# Patient Record
Sex: FEMALE | Race: WHITE | NOT HISPANIC OR LATINO | Employment: FULL TIME | URBAN - METROPOLITAN AREA
[De-identification: names, ages, dates, MRNs, and addresses within clinical notes are randomized per-mention and may not be internally consistent; named-entity substitution may affect disease eponyms.]

---

## 2022-04-11 ENCOUNTER — HOSPITAL ENCOUNTER (EMERGENCY)
Facility: HOSPITAL | Age: 32
Discharge: HOME | End: 2022-04-11
Attending: EMERGENCY MEDICINE

## 2022-04-11 VITALS
OXYGEN SATURATION: 100 % | DIASTOLIC BLOOD PRESSURE: 76 MMHG | TEMPERATURE: 98 F | SYSTOLIC BLOOD PRESSURE: 121 MMHG | HEART RATE: 62 BPM | HEIGHT: 67 IN | RESPIRATION RATE: 16 BRPM | WEIGHT: 135 LBS | BODY MASS INDEX: 21.19 KG/M2

## 2022-04-11 DIAGNOSIS — T16.1XXA FOREIGN BODY OF RIGHT EAR, INITIAL ENCOUNTER: Primary | ICD-10-CM

## 2022-04-11 PROCEDURE — 25000003 PHARM REV CODE 250: Mod: ER | Performed by: PHYSICIAN ASSISTANT

## 2022-04-11 PROCEDURE — 99283 EMERGENCY DEPT VISIT LOW MDM: CPT | Mod: 25,ER

## 2022-04-11 PROCEDURE — 69200 CLEAR OUTER EAR CANAL: CPT | Mod: RT,ER

## 2022-04-11 RX ORDER — DOCUSATE SODIUM 50 MG/5ML
100 LIQUID ORAL
Status: COMPLETED | OUTPATIENT
Start: 2022-04-11 | End: 2022-04-11

## 2022-04-11 RX ADMIN — DOCUSATE SODIUM 100 MG: 50 LIQUID ORAL at 07:04

## 2022-04-12 NOTE — ED PROVIDER NOTES
Encounter Date: 4/11/2022       History     Chief Complaint   Patient presents with    Foreign Body in Ear     Pt had bug crawl in ear last night, put olive oil in ear no longer feels it moving but did not come out of right ear.      HPI: Teresa Bennett, a 31 y.o. female  has no past medical history on file.     She presents to the ED for evaluation of FB to right ear.  States that she felt a bug crawling to area.  Tried olive oil and irrigation but it concerned that it is still present.  Denies pain.        The history is provided by the patient.     Review of patient's allergies indicates:  No Known Allergies  No past medical history on file.  No past surgical history on file.  No family history on file.     Review of Systems   Constitutional: Negative for fever.   HENT:        FB to right ear   Skin: Negative for color change and rash.   Allergic/Immunologic: Negative for immunocompromised state.   Hematological: Negative for adenopathy.   Psychiatric/Behavioral: Negative for agitation.   All other systems reviewed and are negative.      Physical Exam     Initial Vitals [04/11/22 1933]   BP Pulse Resp Temp SpO2   120/77 60 16 98.4 °F (36.9 °C) 99 %      MAP       --         Physical Exam    Nursing note and vitals reviewed.  Constitutional: She appears well-developed and well-nourished. She is not diaphoretic. No distress.   HENT:   Head: Normocephalic and atraumatic.   Right Ear: External ear normal.   Left Ear: External ear normal.   Nose: Nose normal.   Eyes: Conjunctivae and EOM are normal.   Neck:   Normal range of motion.  Cardiovascular: Normal rate and regular rhythm.   Pulmonary/Chest: No respiratory distress.   Musculoskeletal:         General: Normal range of motion.      Cervical back: Normal range of motion.     Neurological: She is alert and oriented to person, place, and time.   Skin: No rash noted.   Psychiatric: She has a normal mood and affect. Thought content normal.         ED Course   Foreign  Body    Date/Time: 4/11/2022 8:15 PM  Performed by: Marika Jameson PA-C  Authorized by: Aguilar Miller MD   Consent Done: Yes  Consent: Verbal consent obtained.  Risks and benefits: risks, benefits and alternatives were discussed  Consent given by: patient  Body area: ear  Removal mechanism: irrigation  Complexity: simple  1 objects recovered.  Objects recovered: insect  Post-procedure assessment: foreign body removed  Patient tolerance: Patient tolerated the procedure well with no immediate complications      Labs Reviewed - No data to display       Imaging Results    None          Medications   docusate 50 mg/5 mL liquid 100 mg (100 mg Oral Given 4/11/22 1945)     Medical Decision Making:   Initial Assessment:   FB to ear   Differential Diagnosis:   FB to ear, TM perforation, AOM   ED Management:  Patient presents to the ER for evaluation potential intact to right ear.  Area was irrigated with return of small brooke .  No further consequence to the patient.  Patient needs no further follow-up at this time.                      Clinical Impression:   Final diagnoses:  [T16.1XXA] Foreign body of right ear, initial encounter (Primary)          ED Disposition Condition    Discharge Stable        ED Prescriptions     None        Follow-up Information    None          Marika Jameson PA-C  04/11/22 2017

## 2022-04-12 NOTE — ED NOTES
Pt right ear flushed with warm water after colace sat 15 min, small bug with wings flushed out of ear. MD at bedside.

## 2022-12-20 ENCOUNTER — HOSPITAL ENCOUNTER (EMERGENCY)
Facility: HOSPITAL | Age: 32
Discharge: HOME | End: 2022-12-20
Attending: STUDENT IN AN ORGANIZED HEALTH CARE EDUCATION/TRAINING PROGRAM

## 2022-12-20 VITALS
RESPIRATION RATE: 17 BRPM | HEART RATE: 90 BPM | TEMPERATURE: 98 F | DIASTOLIC BLOOD PRESSURE: 84 MMHG | WEIGHT: 145 LBS | OXYGEN SATURATION: 100 % | BODY MASS INDEX: 22.76 KG/M2 | SYSTOLIC BLOOD PRESSURE: 143 MMHG | HEIGHT: 67 IN
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
## 2022-12-20 PROCEDURE — 99283 EMERGENCY DEPT VISIT LOW MDM: CPT | Mod: ER

## 2022-12-20 RX ORDER — LIDOCAINE HYDROCHLORIDE 10 MG/ML
5 INJECTION, SOLUTION EPIDURAL; INFILTRATION; INTRACAUDAL; PERINEURAL
Status: COMPLETED | OUTPATIENT
Start: 2022-12-20 | End: 2022-12-20

## 2022-12-20 RX ORDER — AMOXICILLIN AND CLAVULANATE POTASSIUM 875; 125 MG/1; MG/1
1 TABLET, FILM COATED ORAL 2 TIMES DAILY
Qty: 14 TABLET | Refills: 0 | Status: SHIPPED | OUTPATIENT
Start: 2022-12-20 | End: 2022-12-27

## 2022-12-20 NOTE — Clinical Note
"Teresa"Loi Bennett was seen and treated in our emergency department on 12/20/2022.  She may return to work on 12/21/2022.       If you have any questions or concerns, please don't hesitate to call.      Sydney Dominguez PA-C"

## 2022-12-21 NOTE — DISCHARGE INSTRUCTIONS
-Follow up with primary care doctor and return to the ER if you are experiencing any worsening of symptoms    Thank you for letting myself and our team take care for you today! It was nice meeting you, and I hope you feel better very soon. Please come back to [facility] for all of your future medical needs.   Our goal in the ER is to always give you outstanding care and exceptional service. You may receive a survey by mail or email in the next week about your experience in our ED. We would greatly appreciate you completing and returning the survey. Your feedback provides us with a way to recognize our staff who give very good care and it helps us learn how to improve when your experience was below our aspiration of excellence.     Sincerely,     Sydney Dominguez PA-C  Emergency Room Physician Assistant  [facility]

## 2022-12-21 NOTE — ED PROVIDER NOTES
Encounter Date: 12/20/2022       History     Chief Complaint   Patient presents with    Animal Bite     PT reports to ED with C/O dog bite to left hand 30 min PTA. Bleeding controlled. PT reports this is her family dog. Not UTD on shots.      Patient is a 32-year-old female presents to ED with dog bite prior to arrival.  Patient reports she is shortness a bright 2 dogs that were fighting and 1 bit her on the left hand.  Patient presents with a small laceration to the dorsal side of her left hand in between her 5th and 4th metacarpals.  Patient reports she is not up-to-date on her tetanus vaccine and would not like to get the vaccine today. Patient reports the dog that bit her was not up to date on vaccines.    A ten point review of systems was completed and is negative except as documented above.  Patient denies any other acute medical complaint.    The patients available PMH, PSH, Social History, medications, allergies, and triage vital signs were reviewed just prior to their medical evaluation.      The history is provided by the patient.   Review of patient's allergies indicates:  No Known Allergies  History reviewed. No pertinent past medical history.  No past surgical history on file.  History reviewed. No pertinent family history.     Review of Systems   Constitutional:  Negative for fever.   HENT:  Negative for sore throat.    Respiratory:  Negative for shortness of breath.    Cardiovascular:  Negative for chest pain.   Gastrointestinal:  Negative for nausea.   Genitourinary:  Negative for dysuria.   Musculoskeletal:  Negative for back pain.   Skin:  Positive for wound. Negative for rash.   Neurological:  Negative for weakness.   Hematological:  Does not bruise/bleed easily.     Physical Exam     Initial Vitals [12/20/22 2036]   BP Pulse Resp Temp SpO2   (!) 143/84 90 17 98.3 °F (36.8 °C) 100 %      MAP       --         Physical Exam    Nursing note and vitals reviewed.  Constitutional: She appears  well-developed and well-nourished. No distress.   HENT:   Head: Normocephalic and atraumatic.   Eyes: EOM are normal. Pupils are equal, round, and reactive to light. Right eye exhibits no discharge. Left eye exhibits no discharge.   Neck: Neck supple.   Normal range of motion.  Musculoskeletal:         General: Normal range of motion.      Cervical back: Normal range of motion and neck supple.     Neurological: She is alert and oriented to person, place, and time.   Skin: Skin is warm and dry.   1.5 cm superficial laceration to left hand   Psychiatric: She has a normal mood and affect. Her behavior is normal.       ED Course   Lac Repair    Date/Time: 12/20/2022 9:02 PM  Performed by: Sydney Dominguez PA-C  Authorized by: Sydney Dominguez PA-C     Consent:     Consent obtained:  Verbal    Consent given by:  Patient  Laceration details:     Location: left hand.  Treatment:     Area cleansed with:  Povidone-iodine and saline  Skin repair:     Repair method:  Steri-Strips    Number of Steri-Strips:  2  Approximation:     Approximation:  Loose  Repair type:     Repair type:  Simple  Post-procedure details:     Dressing:  Open (no dressing)    Procedure completion:  Tolerated well, no immediate complications  Labs Reviewed - No data to display       Imaging Results    None          Medications   LIDOcaine (PF) 10 mg/ml (1%) injection 50 mg (0 mg Infiltration Return to Cabinet 12/20/22 2045)     Medical Decision Making:   Initial Assessment:   Dog bite onset prior to arrival  Differential Diagnosis:   Dog bite, laceration  ED Management:  Dog bite  -Afebrile, vital signs stable, no apparent distress  -Laceration repaired with steri strips  -Patient refused tetanus vaccine    Plan:  -Augmentin as prescribed  -Keep wound clean, avoid lotions or fragrance soaps  -Patient is in stable condition to be discharged home. Advised patient to follow up with primary care doctor and return to the ED if experiencing any worsening  of symptoms.                          Clinical Impression:   Final diagnoses:  [W54.0XXA] Dog bite, initial encounter (Primary)        ED Disposition Condition    Discharge Stable          ED Prescriptions       Medication Sig Dispense Start Date End Date Auth. Provider    amoxicillin-clavulanate 875-125mg (AUGMENTIN) 875-125 mg per tablet Take 1 tablet by mouth 2 (two) times daily. for 7 days 14 tablet 12/20/2022 12/27/2022 Sydney Dominguez PA-C          Follow-up Information    None          Sydney Dominguez PA-C  12/20/22 [id number]